# Patient Record
Sex: FEMALE | Race: WHITE | NOT HISPANIC OR LATINO | URBAN - METROPOLITAN AREA
[De-identification: names, ages, dates, MRNs, and addresses within clinical notes are randomized per-mention and may not be internally consistent; named-entity substitution may affect disease eponyms.]

---

## 2021-08-07 ENCOUNTER — EMERGENCY (EMERGENCY)
Facility: HOSPITAL | Age: 36
LOS: 1 days | Discharge: HOME | End: 2021-08-07
Admitting: EMERGENCY MEDICINE
Payer: COMMERCIAL

## 2021-08-07 VITALS
RESPIRATION RATE: 16 BRPM | DIASTOLIC BLOOD PRESSURE: 78 MMHG | TEMPERATURE: 98 F | SYSTOLIC BLOOD PRESSURE: 129 MMHG | OXYGEN SATURATION: 100 % | HEART RATE: 90 BPM

## 2021-08-07 PROCEDURE — 99284 EMERGENCY DEPT VISIT MOD MDM: CPT

## 2021-08-07 PROCEDURE — 90792 PSYCH DIAG EVAL W/MED SRVCS: CPT

## 2021-08-07 NOTE — ED ADULT NURSE NOTE - OBJECTIVE STATEMENT
Received pt in  pt c/o anxiety & post partum depression, pt tearful denies si/hi/avh presently emotional reassurance provided eval on going.

## 2021-08-07 NOTE — ED BEHAVIORAL HEALTH ASSESSMENT NOTE - DETAILS
Father - Depression and Anxiety none no acute safety concerns but discussed need to return to ED if she develops any SI/I/P or harms to hurt her baby  aware Activation with Zoloft 75mg, Sedation with Lexapro 2.5mg po and activation with trintellix

## 2021-08-07 NOTE — ED PROVIDER NOTE - CCCP TRG CHIEF CMPLNT
· Does not appear to be on any anticoagulation besides aspirin at this time    · Rates are controlled, Continue metoprolol anxiety

## 2021-08-07 NOTE — ED BEHAVIORAL HEALTH ASSESSMENT NOTE - NSSUICPROTFACT_PSY_ALL_CORE
Responsibility to children, family, or others/Identifies reasons for living/Supportive social network of family or friends/Fear of death or the actual act of killing self/Judaism beliefs

## 2021-08-07 NOTE — ED BEHAVIORAL HEALTH ASSESSMENT NOTE - RISK ASSESSMENT
moderate risk of harm at this time given risk factors including severe anxiety, depressed mood, not bonding with her baby, multiple failed med trials and insomnia.   Protective factors include her [employer], her son, her stable relationship and responsibility to family, no substance use, motivation for treatment, appropriately help seeking, no access to firearms and no premorbid conditions.   Pt has good access to care and will benefit from a specialized  program. Moderate Acute Suicide Risk

## 2021-08-07 NOTE — ED ADULT TRIAGE NOTE - CHIEF COMPLAINT QUOTE
Pt w/ hx of Savanah- OCD depression Anxiety and Post partum depression , now 14 weeks post partum,  c/o overwhelming anxiety x 2 days.  Pt reports she was working with Psychiatrist and plan was to discontinue SSRI, currently only on ativan, psychiatrist is on vacation.  Pt is lacrimis in triage. Pt denies SI HI hallucinations.     German [phone]

## 2021-08-07 NOTE — ED BEHAVIORAL HEALTH ASSESSMENT NOTE - SUMMARY
35yr old  F , domiciled in [place] with  and 4month old son, with a hx of post partum depression, OCD and anxiety, one past psych hospitalizations in [facility] (21 for 1wk) for one week for severe anxiety (masked as psychosis), no past SA, no nssib, no substance use, was BIB  for worsening anxiety.   Patient reports that she's been feeling more depressed and anxious and is constantly worried about not finding the right medication. She reports that she is feeling depressed, having trouble sleeping without ativan, has low energy, not feeling motivated and is having trouble bonding with her baby. Symptoms are most likely consistent with post partum depression however given her multiple trials of SSRIs (activation with some) Bipolar Disorder can also be considered. At this time pt is adamantly denying any SI/I/P, thoughts of hurting others or her baby. She is future oriented, motivated to be in treatment and has been engaged with her outpatient provider. Patient was contemplating inpatient psych hospitalization but feels that she would like to try a more specialized outpatient service for  psychiatry and is inquiring about the  program at [facility]. At this time she is not meeting criteria for involuntary psych hospitalization and is declining voluntary.

## 2021-08-07 NOTE — ED PROVIDER NOTE - CLINICAL SUMMARY MEDICAL DECISION MAKING FREE TEXT BOX
36 y/o F  hx  Post Partum Depression    Medical evaluation performed. There is no clinical evidence of intoxication or any acute medical problem requiring immediate intervention. Patient is awaiting psychiatric consultation. Final disposition will be determined by psychiatrist.

## 2021-08-07 NOTE — ED PROVIDER NOTE - PATIENT PORTAL LINK FT
You can access the FollowMyHealth Patient Portal offered by [facility] by registering at the following website: [email or web address]. By joining [facility]’s FollowMyHealth portal, you will also be able to view your health information using other applications (apps) compatible with our system.

## 2021-08-07 NOTE — ED BEHAVIORAL HEALTH ASSESSMENT NOTE - HPI (INCLUDE ILLNESS QUALITY, SEVERITY, DURATION, TIMING, CONTEXT, MODIFYING FACTORS, ASSOCIATED SIGNS AND SYMPTOMS)
35yr old  F, domiciled in [place] with  and 4month old son, with a hx of post partum depression, OCD and anxiety, one past psych hospitalizations in [facility] for one week for severe anxiety (masked as psychosis), no past SA, no nssib, no substance use, was BIB  for worsening anxiety.     Patient reports that she's been feeling severely depressed with severe anxiety since pregnancy. She states that 35yr old  F , domiciled in [place] with  and 4month old son, with a hx of post partum depression, OCD and anxiety, one past psych hospitalizations in [facility] (21 for 1wk) for one week for severe anxiety (masked as psychosis), no past SA, no nssib, no substance use, was BIB  for worsening anxiety.     Patient reports that she's been feeling severely depressed with severe anxiety since pregnancy. She states that after she gave birth, 3 days after delivery she was admitted inpatient at [facility] for "psychosis" where she was severely anxious and stuttering. She was given Ativan and immediately calmed down and started to talk. Patient states that she has some PTSD from being inpatient as well and is very scared of psychiatrists because of the admission but understands she needs to be in treatment. She also reports that she's been on multiple SSRIs (Zoloft, Trintellix, Lexapro) but has had adverse reactions from each of them and the fact that she's not responding well to the SSRIs, she is getting anxious as well.    Patient states after [facility] she was enrolled in the [facility] program where she was on Zoloft 75mg and Zyprexa 2.5mg po qhs but did not find it helpful so when she left the program she saw a psychiatrist Niru Mcfadden who tried her on Trintellix (activated) and Lexapro 2.5mg (overly sedated). Her psychiatrist is now on vacation so she has been seeing a covering psychiatrist who suggested that she not take any SSRIs and be monitored that way but patient will continue to take Ativan (0.25mg throughout the day) to help with anxiety.     In terms of her mood symptoms, she admits to feeling very sad, she's tearful in the ED, reports poor sleep because of her anxiety (Ativan helps her sleep) but she does not feel well rested, has some difficulty being motivated and does not feel that she's bonding well with her baby which is very upsetting to her. She states she tries to mimic her  (laughing/smiling with the baby) but finds it difficult and overwhelming. She has feelings of guilt at this time but is hopeful that she will get better with the right treatment. Patient is also adamantly denying any SI/I/P, HI/I/P, has never had any thoughts of hurting anyone else or her baby either. She is future oriented, and desperately wants to get better.      Patient is denying any psychotic symptoms at this time including AH/VH, or paranoid delusions and denying any manic symptoms (grandiosity, flight of ideas, racing thoughts etc.). She is also denying any substance or alcohol use. 35yr old  F , domiciled in [place] with  and 4month old son, with a hx of post partum depression, OCD and anxiety, one past psych hospitalizations in [facility] (21 for 1wk) for one week for severe anxiety (masked as psychosis), no past SA, no nssib, no substance use, was BIB  for worsening anxiety.     Patient reports that she's been feeling severely depressed with severe anxiety since pregnancy. She states that after she gave birth, 3 days after delivery she was admitted inpatient at [facility] for "psychosis" where she was severely anxious and stuttering. She was given Ativan and immediately calmed down and started to talk. Patient states that she has some PTSD from being inpatient as well and is very scared of psychiatrists because of the admission but understands she needs to be in treatment. She also reports that she's been on multiple SSRIs (Zoloft, Trintellix, Lexapro) but has had adverse reactions from each of them and the fact that she's not responding well to the SSRIs, she is getting anxious as well.    Patient states after [facility] she was enrolled in the [facility] program where she was on Zoloft 75mg and Zyprexa 2.5mg po qhs but did not find it helpful so when she left the program she saw a psychiatrist Niru Mcfadden who tried her on Trintellix (activated) and Lexapro 2.5mg (overly sedated). Her psychiatrist is now on vacation so she has been seeing a covering psychiatrist who suggested that she not take any SSRIs and be monitored that way but patient will continue to take Ativan (0.25mg throughout the day) to help with anxiety.     In terms of her mood symptoms, she admits to feeling very sad, she's tearful in the ED, reports poor sleep because of her anxiety (Ativan helps her sleep) but she does not feel well rested, has some difficulty being motivated and does not feel that she's bonding well with her baby which is very upsetting to her. She states she tries to mimic her  (laughing/smiling with the baby) but finds it difficult and overwhelming. She has feelings of guilt at this time but is hopeful that she will get better with the right treatment. Patient is also adamantly denying any SI/I/P, HI/I/P, has never had any thoughts of hurting anyone else or her baby either. She is future oriented, and desperately wants to get better.  She also reports that she's constantly worried and anxious which started during her pregnancy. She was overwhelmed about having gestational diabetes, and when that got better she started worrying about debt, then she recently started worrying about SSRIs not helping her and she finds it difficult to stop obsessing over new things.     Patient is denying any psychotic symptoms at this time including AH/VH, or paranoid delusions and denying any manic symptoms (grandiosity, flight of ideas, racing thoughts etc.). She is also denying any substance or alcohol use.

## 2021-08-07 NOTE — ED ADULT NURSE NOTE - ED STAT RN HANDOFF DETAILS
DC home by NP, discharge instructions reviewed with Psych MD and NP at length, pt velarized understanding with the resources provided and plan of care. pt escorted outside, into 's custody.

## 2021-08-07 NOTE — ED BEHAVIORAL HEALTH NOTE - BEHAVIORAL HEALTH NOTE
Worker called patient’s  [employer] ([phone]) for collateral information. All information is as per spouse:    Patient is a 35 year old female, domiciled with  and 14 week son, unemployed, bib accompanied by  for anxiety.  reports that the patient has a dx of anxiety and depression. He states that the patient gave birth to their son on 4/24 and was admitted psychiatrically for a week to [facility] on 4/29. Spouse reports that the patient was initially dx with depression with psychotic features which was a misdiagnosis. He states that the patient at that time was given Zyprexa and Ativan. He states that upon her discharge she was sent to [facility] partial program for a 6 week program. He states the program determined that the patient did not have psychosis but obsessive thoughts. He states that the patient was trialed on different SSRI's and she had different side effects from this. He states that the patient was struggling with the program being virtual and left the program early. He states that the patient was discontinued on Zyprexa because she did not present with psychotic features.  He states that the patient is connected to psychiatrist Niru Mcfadden but currently she is on vacation and the patient is seeing covering psychiatrist Sheila Myers. Patient is currently on Ativan 1 and a half mg and is being tapered off this. Patient also stopped Lexapro three days ago because she was having side effects. Patient was prescribed Lexapro, trintellix, and Zoloft in the past, all which she had side affects from . He states that the patient has a fear of doctors and medication. He states that the patient also feels that she has no one to care for her and this is partly because her doctor is currently on vacation. He states that the patient has been very good with caring for their child. No safety concerns noted. He states that the baby is currently with his sister. He states that throughout all of this the patient struggles with her sleep. He states that for the last week he noticed the patient has not been eating well. He states that the patient also has been disconnected and not interacting with their child for the last couple of days. Patient is currently not having any si. Spouse reports that a week and a half ago the patient had  suicidal thoughts and said  what she was not around anymore, but stated no plan.  Spouse states that the stressor at that time was that she found out that her psychiatrist was leaving on vacation. Spouse states that when the pt was in her third trimester she struggled because she had gestational diabetes and felt pressures around this. Patient is currently not taking any medication for diabetes.  also states that the patient was having obsessive thoughts during her third trimester about bills.  states that after the patient started  Lexapro/ trintellix  she could not  stop crying and presented with irritability. He states that the patient is not violent. He states that for the past week the patient feels like she needs a doctor who can care for her. He states that the plan for the patient is to sergio off all medications and start talk therapy. He states that he is not sure if the patient’s psychiatrist will start her on a new SSRI. He states that the patient feels horrible because she has been on a number of medications and feels as if it did not help her. He denies that the patient has past SA. He states that the patient has no access to guns or weapons. He states that the patient has no AH/VH. He states that the patient has no covid- 19 exposure and was vaccinated 3 weeks after she gave birth with Pfizer. Patient has a follow up apt with covering psychiatrist on Monday 8/9 @4:00pm . case discussed with psychiatry. Worker called patient’s  [employer] ([phone]) for collateral information. All information is as per spouse:    Patient is a 35 year old female, domiciled with  and 14 week son, unemployed, bib accompanied by  for anxiety.  reports that the patient has a dx of anxiety and depression. He states that the patient gave birth to their son on  and was admitted psychiatrically for a week to [facility] on . Spouse reports that the patient was initially dx with depression with psychotic features which was a misdiagnosis. He states that the patient at that time was given Zyprexa and Ativan. He states that upon her discharge she was sent to [facility] partial program for a 6 week program. He states the program determined that the patient did not have psychosis but obsessive thoughts. He states that the patient was trialed on different SSRI's and she had different side effects from this. He states that the patient was struggling with the program being virtual and left the program early. He states that the patient was discontinued on Zyprexa because she did not present with psychotic features.  He states that the patient is connected to psychiatrist Niru Mcfadden but currently she is on vacation and the patient is seeing covering psychiatrist Sheila Myers. Patient is currently on Ativan 1 and a half mg and is being tapered off this. Patient also stopped Lexapro three days ago because she was having side effects. Patient was prescribed Lexapro, trintellix, and Zoloft in the past, all which she had side affects from . He states that the patient has a fear of doctors and medication. He states that the patient also feels that she has no one to care for her and this is partly because her doctor is currently on vacation. He states that the patient has been very good with caring for their child. No safety concerns noted. He states that the baby is currently with his sister. He states that throughout all of this the patient struggles with her sleep. He states that for the last week he noticed the patient has not been eating well. He states that the patient also has been disconnected and not interacting with their child for the last couple of days. Patient is currently not having any si. Spouse reports that a week and a half ago the patient had  suicidal thoughts and said  what she was not around anymore, but stated no plan.  Spouse states that the stressor at that time was that she found out that her psychiatrist was leaving on vacation. Spouse states that when the pt was in her third trimester she struggled because she had gestational diabetes and felt pressures around this. Patient is currently not taking any medication for diabetes.  also states that the patient was having obsessive thoughts during her third trimester about bills.  states that after the patient started  Lexapro/ trintellix  she could not  stop crying and presented with irritability. He states that the patient is not violent. He states that for the past week the patient feels like she needs a doctor who can care for her. He states that the plan for the patient is to sergio off all medications and start talk therapy. He states that he is not sure if the patient’s psychiatrist will start her on a new SSRI. He states that the patient feels horrible because she has been on a number of medications and feels as if it did not help her. He denies that the patient has past SA. He states that the patient has no access to guns or weapons. He states that the patient has no AH/VH. He states that the patient has no covid- 19 exposure and was vaccinated 3 weeks after she gave birth with Pfizer. Patient has a follow up apt with covering psychiatrist on  @4:00pm . case discussed with psychiatry.    Worker informed patient's spouse of patient discharge. Worker encouraged that patient follow with [facility]  clinic and was provided information to clinic.  Pt's spouse is outside of the er waiting to pick patient up.

## 2021-08-07 NOTE — ED BEHAVIORAL HEALTH ASSESSMENT NOTE - OTHER PAST PSYCHIATRIC HISTORY (INCLUDE DETAILS REGARDING ONSET, COURSE OF ILLNESS, INPATIENT/OUTPATIENT TREATMENT)
One past psych hospitalization in [facility] for one week post partum  1.5 months of treatment at [facility] Motherhood program  Currently in outpatient tx with Dr. Mcfadden

## 2021-08-07 NOTE — ED ADULT NURSE NOTE - CHIEF COMPLAINT QUOTE
Pt w/ hx of Savanah- OCD depression Anxiety and Post partum depression , now 14 weeks post partum,  c/o overwhelming anxiety x 2 days.  Pt reports she was working with Psychiatrist and plan was to discontinue SSRI, currently only on ativan, psychiatrist is on vacation.  Pt is lacrimis in triage. Pt denies SI HI hallucinations.     German [phone]

## 2021-08-08 NOTE — ED BEHAVIORAL HEALTH NOTE - BEHAVIORAL HEALTH NOTE
High Risk Log:  Worker called patient [phone] who states that she has a an apt with covering psychiatrist on Monday. Worker also encouraged her to follow up with the  program at [facility].

## 2022-04-26 NOTE — ED BEHAVIORAL HEALTH ASSESSMENT NOTE - CURRENT PLAN:
Ultrasound orders placed.  Ready to schedule.      BRYANNA:  10/29/2024  GA:  13w3d  Multiple gestation:  no  Dx: subchorionic hemorrhage noted on previous scan  Referring provider: Alexandrea  Ultrasound(s) ordered:  viability  Genetics: already complete  Approximate date to be seen:  In the next 7-10 days      
None known

## 2022-06-15 ENCOUNTER — OUTPATIENT (OUTPATIENT)
Dept: OUTPATIENT SERVICES | Facility: HOSPITAL | Age: 37
LOS: 1 days | Discharge: HOME | End: 2022-06-15

## 2022-06-15 PROBLEM — F41.9 ANXIETY DISORDER, UNSPECIFIED: Chronic | Status: ACTIVE | Noted: 2021-08-07

## 2022-06-23 PROCEDURE — 90791 PSYCH DIAGNOSTIC EVALUATION: CPT

## 2022-06-23 NOTE — ECT CONSULT NOTE - NSSUICPROTFACT_PSY_ALL_CORE
Responsibility to children, family, or others/Identifies reasons for living/Supportive social network of family or friends/Fear of death or the actual act of killing self/Cultural, spiritual and/or moral attitudes against suicide/Positive therapeutic relationships/Ability to cope with stress/Faith beliefs

## 2022-06-23 NOTE — ECT CONSULT NOTE - NSECTASSESSRECOMM_PSY_ALL_CORE
A course of ECT is a reasonable cchoice for the patient's severe, treatment-resistant major depressive disorder episode.    The patient encounter was from 1400 to 1510      More than 50% of the time was spent in counselling and/or coordination of care, including but not limited to discussing with patient and family the risks and benefits of ECT including the risk of permanent cognitive effects, the usual trajectory of response with acute course of ECT, reviewing the basis for informed consent for ECT, reviewing ECT fasting guidelines, and reviewing the need for periodic history and physical and labwork. Patient was asked to obtain labwork (CBC, BMP, COVID19), EKG and medical clearance.     Patient requires a dental consultation and mouthguard fabrication for a grossly mobile, discolored tooth she reported has "no root."     Referring psychiatrist was contacted.

## 2022-06-23 NOTE — ECT CONSULT NOTE - NSECTMENTALSTATUSEXAM_PSY_ALL_CORE
Conscious, cooperative, alert. Mild psychomotor agitation.  Good camera "eye" contact.   Speech: regular rate and rhythm, later became halting and stuttering as she became more anxious and dysregulated for a few minutes  Mood: "Depressed" Affect: anxious   Thought Process: mild perseveration on hopelessness  Thought Content: Passive Death wish, No Suicidal intent/plan/preparation, No homicidal ideation/intent/plan. Apparent overvalued idea of "PSSD"   Perception: No hallucinations   Insight and Judgement: fair  Impulse Control: fair at this time.
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
## 2022-06-23 NOTE — ECT CONSULT NOTE - NSECTPASTTXTRIALSDETAILS_PSY_ALL_CORE
She had trials of sertraline, bupropion, escitalopram, vortioxetine, olanzapine, lorazepam, and ketamine either without benefit or she was unable to tolerate the Rx She had trials of brexanolone, sertraline, bupropion, escitalopram, vortioxetine, olanzapine, lorazepam, and ketamine either without benefit or she was unable to tolerate the Rx

## 2022-06-23 NOTE — ECT CONSULT NOTE - NSECTREASONCONSCOMMENTS_PSY_ALL_CORE
Patient in her home in [facility], writer in [place], seen by HIPAA secure platform with pt's express consent Patient in her home in [facility], writer in [place], seen by HIPAA secure platform with pt's express consent;  present for interview
